# Patient Record
(demographics unavailable — no encounter records)

---

## 2025-01-07 NOTE — PHYSICAL EXAM
[FreeTextEntry1] : Neurological (>12): MS: Awake, alert.  Oriented to self, place and time.  Follows simple complex cross commands. Attends to examiner Language: Speech is clear, fluent, good repetition,  comprehension, registration of words. CNs: PERRL (R 3mm, L 3mm). VFF. EOMI. No disconjugate gaze, nystagmus. V1-3 intact LT, No facial asymmetry b/l. Hearing grossly normal b/l. Tongue midline and can move side to side.   Motor - Normal bulk and tone throughout. No pronator drift.  L/R (out of 5 each)       Deltoid  5/5    Biceps   5/5      Triceps  5/5         Wrist Extension 5/5   Wrist Flexion  5/5   Interossei 5/5     5/5  L/R (out of 5 each)       Hip Flexion  5/5    Hip Extension  5/5  Knee Extension  5/5  Dorsiflexion  5/5      Plantar Flexion 5/5   Sensation: Intact to LT b/l x4 extremities. Cortical: Extinction on DSS (neglect): none Reflexes L/R:  Biceps(C5) 2/2  BR(C6) 2/2   Triceps(C7)  2/2 Patellar(L4)   2/2   Ankles 2/2  Toes: Withdraws bilaterally  Coordination: No dysmetria to FTN b/l UE Gait: No postural instability. Normal stance. Gait baseline.

## 2025-01-07 NOTE — HISTORY OF PRESENT ILLNESS
[FreeTextEntry1] : Ms. Stroud is a 52-year-old right-handed female PMHX HLD who presents today after possible TIA on 9/28/2024. Primarily [place] speaking -  used ID #[id number]  53 yo ED as code stroke for an episode of burning paresthesia to L head, L ear, and L arm, onset 1330, duration 10-15 minutes, has never happened before, now resolved.   Initial VS in ED: 135/72 Exam: no FND mRS: 0 LKN: 1330 09/27/2024 NIHSS: 0  NOT a tenecteplase candidate due to out of window NOT a mechanical thrombectomy candidate due to no LVO CT C/A/P: No definite evidence of primary or metastatic malignancy or suspicious lymphadenopathy. MRI Head (9/28/2024) The white matter abnormality extending from the left corona radiata to the left basal ganglia is nonspecific. While this may be post infectious or inflammatory in nature, a chronic infarct, demyelinating disease or low-grade neoplasm is not completely excluded.  1/7/2025 She notes that she has not had any recurrent symptoms since the ER visit. Denies any neurological symptoms in the past including weakness. She notes compliance with ASA and finished Plavix. She was advised to follow up with Dr. Santos or Dr. Irby for MR findings but has not done so yet.

## 2025-01-07 NOTE — HISTORY OF PRESENT ILLNESS
[FreeTextEntry1] : Ms. Stroud is a 52-year-old right-handed female PMHX HLD who presents today after possible TIA on 9/28/2024. Primarily [place] speaking -  used ID #[id number]  51 yo ED as code stroke for an episode of burning paresthesia to L head, L ear, and L arm, onset 1330, duration 10-15 minutes, has never happened before, now resolved.   Initial VS in ED: 135/72 Exam: no FND mRS: 0 LKN: 1330 09/27/2024 NIHSS: 0  NOT a tenecteplase candidate due to out of window NOT a mechanical thrombectomy candidate due to no LVO CT C/A/P: No definite evidence of primary or metastatic malignancy or suspicious lymphadenopathy. MRI Head (9/28/2024) The white matter abnormality extending from the left corona radiata to the left basal ganglia is nonspecific. While this may be post infectious or inflammatory in nature, a chronic infarct, demyelinating disease or low-grade neoplasm is not completely excluded.  1/7/2025 She notes that she has not had any recurrent symptoms since the ER visit. Denies any neurological symptoms in the past including weakness. She notes compliance with ASA and finished Plavix. She was advised to follow up with Dr. Santos or Dr. Irby for MR findings but has not done so yet.

## 2025-01-07 NOTE — DISCUSSION/SUMMARY
[Antithrombotic therapy with ___] : antithrombotic therapy with  [unfilled] [Patient encouraged to discuss with Primary MD] : I encouraged the patient to discuss these important issues with ~his/her~ primary care doctor [Goals and Counseling] : I have reviewed the goals of stroke risk factor modification. I counseled the patient on measures to reduce stroke risk, including the importance of medication compliance, risk factor control, exercise, healthy diet and avoidance of smoking. I reviewed stroke warning signs and symptoms and appropriate actions to take if such occur. [FreeTextEntry1] : Impression: Transient L ear, head, and arm burning paresthesia. Localization possibly to R brain dysfunction (cortex or thalamus). found to have a Left white matter lesion in corona radiata. Concern for chronic infarct vs demyelinating disease vs low grade tumor. Symptoms would not localize to L white matter lesion. Transient BL gritty eye sensation that alleviated w wiping eyes. Likely dt debris.  Neurologically she is stable without any recurrent symptoms. I have ordered MRI head w.wo to be done to assess for stability of previously noted lesion. She was advised to follow up with Dr. Santos/Dr. Irby but we can determine that after repeat MR is completed. She can continue ASA. Finished Plavix. Need for further stroke workup including cardiac monitoring, hypercoag to be determined after repeat imaging. Strict risk factor control discussed.   I will call her after MR studies to discuss further recommendations. Signs of stroke discussed in detail.